# Patient Record
Sex: FEMALE
[De-identification: names, ages, dates, MRNs, and addresses within clinical notes are randomized per-mention and may not be internally consistent; named-entity substitution may affect disease eponyms.]

---

## 2017-07-17 ENCOUNTER — HOSPITAL ENCOUNTER (EMERGENCY)
Dept: HOSPITAL 5 - ED | Age: 52
Discharge: LEFT BEFORE DISCHARGE | End: 2017-07-17
Payer: MEDICARE

## 2017-07-17 VITALS — SYSTOLIC BLOOD PRESSURE: 123 MMHG | DIASTOLIC BLOOD PRESSURE: 80 MMHG

## 2017-07-17 DIAGNOSIS — R22.42: Primary | ICD-10-CM

## 2017-07-17 DIAGNOSIS — Z88.2: ICD-10-CM

## 2017-07-17 DIAGNOSIS — Z53.21: ICD-10-CM

## 2017-07-17 LAB
ALBUMIN SERPL-MCNC: 3.4 G/DL (ref 3.9–5)
ALBUMIN/GLOB SERPL: 0.9 %
ALP SERPL-CCNC: 117 UNITS/L (ref 35–129)
ALT SERPL-CCNC: 84 UNITS/L (ref 7–56)
ANION GAP SERPL CALC-SCNC: 20 MMOL/L
BASOPHILS NFR BLD AUTO: 0.7 % (ref 0–1.8)
BILIRUB SERPL-MCNC: 0.3 MG/DL (ref 0.1–1.2)
BUN SERPL-MCNC: 10 MG/DL (ref 7–17)
BUN/CREAT SERPL: 12.5 %
CALCIUM SERPL-MCNC: 8.8 MG/DL (ref 8.4–10.2)
CHLORIDE SERPL-SCNC: 99.3 MMOL/L (ref 98–107)
CO2 SERPL-SCNC: 24 MMOL/L (ref 22–30)
EOSINOPHIL NFR BLD AUTO: 1.9 % (ref 0–4.3)
GLUCOSE SERPL-MCNC: 100 MG/DL (ref 65–100)
HCT VFR BLD CALC: 39.9 % (ref 30.3–42.9)
HGB BLD-MCNC: 13.5 GM/DL (ref 10.1–14.3)
MCH RBC QN AUTO: 33 PG (ref 28–32)
MCHC RBC AUTO-ENTMCNC: 34 % (ref 30–34)
MCV RBC AUTO: 96 FL (ref 79–97)
PLATELET # BLD: 246 K/MM3 (ref 140–440)
POTASSIUM SERPL-SCNC: 4.8 MMOL/L (ref 3.6–5)
PROT SERPL-MCNC: 7.2 G/DL (ref 6.3–8.2)
RBC # BLD AUTO: 4.17 M/MM3 (ref 3.65–5.03)
SODIUM SERPL-SCNC: 138 MMOL/L (ref 137–145)
WBC # BLD AUTO: 8.6 K/MM3 (ref 4.5–11)

## 2017-07-17 PROCEDURE — 85025 COMPLETE CBC W/AUTO DIFF WBC: CPT

## 2017-07-17 PROCEDURE — 83880 ASSAY OF NATRIURETIC PEPTIDE: CPT

## 2017-07-17 PROCEDURE — 36415 COLL VENOUS BLD VENIPUNCTURE: CPT

## 2017-07-17 PROCEDURE — 80053 COMPREHEN METABOLIC PANEL: CPT

## 2017-07-17 NOTE — EMERGENCY DEPARTMENT REPORT
Chief Complaint: Extremity Problem,Nontraumatic


Stated Complaint: SWELLING IN LEFT LEG AND FOOT


Time Seen by Provider: 07/17/17 11:28





- HPI


History of Present Illness: 





PT c/o waking up on Saturday with leg swelling.  PT states her Left leg is more 

swollen than her R 





- ROS


Review of Systems: 





+ swelling


- chest pain 





- Exam


Vital Signs: 


 Vital Signs











  07/17/17





  11:20


 


Temperature 98.4 F


 


Pulse Rate 96 H


 


Respiratory 20





Rate 


 


Blood Pressure 120/69


 


O2 Sat by Pulse 100





Oximetry 











Physical Exam: 





scar to chest


+ swelling to LLE 


no calf tenderness jesús 


MSE screening note: 


Focused history and physical exam performed.


Due to findings the following was ordered:





labs, us 





ED Disposition for MSE


Condition: Stable

## 2017-07-18 NOTE — VASCULAR LAB REPORT
Left Lower Extremity Venous Duplex Study:



Reason for Exam: Swelling of the left lower extremity.



Comments on the Right:

A limited duplex study was done of the proximal veins of the right 

lower extremity. All veins visualized are freely compressible without 

evidence of internal echogenicity.  Flow is spontaneous and phasic 

throughout. No evidence of acute or chronic thrombus is seen in any of 

the vessels visualized.



Comments on the Left:

All veins visualized are freely compressible without evidence of 

internal echogenicity.  Flow is spontaneous and phasic throughout. No 

evidence of acute or chronic thrombus is seen in any of the vessels 

visualized.



Impression:

No evidence of acute or chronic deep venous thrombosis in the left 

lower extremity.

## 2017-07-18 NOTE — ED ELOPEMENT REVIEW
ED Pt Elopement review





- Results review


Lab results: 





 Laboratory Tests











  07/17/17 07/17/17





  11:36 11:36


 


WBC  8.6 


 


RBC  4.17 


 


Hgb  13.5 


 


Hct  39.9 


 


MCV  96 


 


MCH  33 H 


 


MCHC  34 


 


RDW  16.5 H 


 


Plt Count  246 


 


Lymph % (Auto)  39.8 H 


 


Mono % (Auto)  6.6 


 


Eos % (Auto)  1.9 


 


Baso % (Auto)  0.7 


 


Lymph #  3.4 


 


Mono #  0.6 


 


Eos #  0.2 


 


Baso #  0.1 


 


Seg Neutrophils %  51.0 


 


Seg Neutrophils #  4.4 


 


Sodium   138


 


Potassium   4.8


 


Chloride   99.3


 


Carbon Dioxide   24


 


Anion Gap   20


 


BUN   10


 


Creatinine   0.8


 


Estimated GFR   > 60


 


BUN/Creatinine Ratio   12.50


 


Glucose   100


 


Calcium   8.8


 


Total Bilirubin   0.30


 


AST   65 H


 


ALT   84 H


 


Alkaline Phosphatase   117


 


NT-Pro-B Natriuret Pep   272.1


 


Total Protein   7.2


 


Albumin   3.4 L


 


Albumin/Globulin Ratio   0.9














- Call Back decision


Pt Call Back Decision: Pt to F/U with PMD

## 2017-11-10 ENCOUNTER — HOSPITAL ENCOUNTER (EMERGENCY)
Dept: HOSPITAL 5 - ED | Age: 52
Discharge: HOME | End: 2017-11-10
Payer: MEDICARE

## 2017-11-10 DIAGNOSIS — L02.212: Primary | ICD-10-CM

## 2017-11-10 DIAGNOSIS — L03.312: ICD-10-CM

## 2017-11-10 DIAGNOSIS — F17.200: ICD-10-CM

## 2017-11-10 PROCEDURE — 71020: CPT

## 2017-11-10 NOTE — EMERGENCY DEPARTMENT REPORT
Abscess Boil HPI





- HPI


Chief Complaint: Skin/Abscess/Foreign Body


Stated Complaint: BOIL ON BACK


Time Seen by Provider: 11/10/17 19:12


Duration: >1 Week (3 months)


Location: Back


History: Yes Pain, Yes Previous History, No Fever, No Purulent Drainage, No 

Numbness, No Foreign Body, No Insect Bite


Home Medications: 


 Previous Rx's











 Medication  Instructions  Recorded  Last Taken  Type


 


Clindamycin [Clindamycin CAP] 300 mg PO Q8HR #30 capsule 11/10/17 Unknown Rx











Allergies/Adverse Reactions: 


 Allergies











Allergy/AdvReac Type Severity Reaction Status Date / Time


 


Sulfa (Sulfonamide AdvReac  Hives Verified 07/17/17 11:19





Antibiotics)     














ED Review of Systems


ROS: 


Stated complaint: BOIL ON BACK


Other details as noted in HPI





Constitutional: denies: chills, fever


Eyes: denies: eye pain, eye discharge, vision change


ENT: denies: ear pain, throat pain


Respiratory: denies: cough, shortness of breath, wheezing


Cardiovascular: denies: chest pain, palpitations


Endocrine: no symptoms reported


Gastrointestinal: denies: abdominal pain, nausea, diarrhea


Genitourinary: denies: urgency, dysuria, discharge


Musculoskeletal: denies: back pain, joint swelling, arthralgia


Skin: as per HPI


Neurological: denies: headache, weakness, paresthesias


Psychiatric: denies: anxiety, depression


Hematological/Lymphatic: denies: easy bleeding, easy bruising





ED Past Medical Hx





- Past Medical History


Previous Medical History?: Yes


Additional medical history: MVP.  Blood clot in left leg





- Surgical History


Past Surgical History?: No


Hx Open Heart Surgery: Yes





- Social History


Smoking Status: Current Every Day Smoker


Substance Use Type: Alcohol





- Medications


Home Medications: 


 Home Medications











 Medication  Instructions  Recorded  Confirmed  Last Taken  Type


 


Clindamycin [Clindamycin CAP] 300 mg PO Q8HR #30 capsule 11/10/17  Unknown Rx














ED Abscess Boil Physical Exam





- Exam


General: 


Vital signs noted. No distress. Alert and acting appropriately.





Size: 3 cm


Exam: Yes Tenderness, Yes Fluctuance, Yes Surrounding Cellulites/Erythema, Yes 

Normal Neurologic Exam, Yes Normal Circulation, No Lymphangitis, No Crepitation

, No Heart Murmur





I & D Note





- I & D Note


I & D Note: I/D abscess of the right upper back.  The site was prepped and 

draped in a sterile fashion.  It was anesthetized with 2% lidocaine.  The 

abscess was then drained with manual manipulation.  Patient tolerated the 

procedure with no complications.





ED Course


 Vital Signs











  11/10/17





  17:12


 


Temperature 97.7 F


 


Pulse Rate 100 H


 


Respiratory 16





Rate 


 


Blood Pressure 122/72


 


O2 Sat by Pulse 96





Oximetry 











Critical care attestation.: 


If time is entered above; I have spent that time in minutes in the direct care 

of this critically ill patient, excluding procedure time.








ED Disposition


Clinical Impression: 


 Abscess of upper back excluding scapular region, Cellulitis of back





Disposition: DC-01 TO HOME OR SELFCARE


Is pt being admited?: No


Does the pt Need Aspirin: No


Condition: Stable


Instructions:  Abscess Incision and Drainage (ED), Cellulitis (ED)


Additional Instructions: 


Take medicine as prescribed.  Remember to use an antibacterial soap when 

bathing at all times. 


Prescriptions: 


Clindamycin [Clindamycin CAP] 300 mg PO Q8HR #30 capsule


Referrals: 


PRIMARY CARE,MD [Primary Care Provider] - 3-5 Days


Time of Disposition: 20:01

## 2017-11-11 VITALS — SYSTOLIC BLOOD PRESSURE: 141 MMHG | DIASTOLIC BLOOD PRESSURE: 81 MMHG

## 2017-11-11 NOTE — XRAY REPORT
XRAY CHEST TWO VIEWS: 11/10/17 16:28:00



CLINICAL: Productive cough.



COMPARISON: None



FINDINGS: Normal heart and pulmonary vasculature. Lingular 

opacification on the lateral view and indistinctness of the left heart 

border on the frontal view.  Median sternotomy wires.



IMPRESSION: Suspect lingular pneumonia.

## 2017-12-26 ENCOUNTER — HOSPITAL ENCOUNTER (INPATIENT)
Dept: HOSPITAL 5 - ED | Age: 52
LOS: 2 days | Discharge: HOME | DRG: 638 | End: 2017-12-28
Attending: INTERNAL MEDICINE | Admitting: INTERNAL MEDICINE
Payer: MEDICARE

## 2017-12-26 DIAGNOSIS — E11.65: Primary | ICD-10-CM

## 2017-12-26 DIAGNOSIS — Z83.3: ICD-10-CM

## 2017-12-26 DIAGNOSIS — F17.200: ICD-10-CM

## 2017-12-26 DIAGNOSIS — F22: ICD-10-CM

## 2017-12-26 DIAGNOSIS — I10: ICD-10-CM

## 2017-12-26 DIAGNOSIS — N30.00: ICD-10-CM

## 2017-12-26 DIAGNOSIS — Z88.2: ICD-10-CM

## 2017-12-26 DIAGNOSIS — Z82.49: ICD-10-CM

## 2017-12-26 DIAGNOSIS — F10.10: ICD-10-CM

## 2017-12-26 LAB
ALBUMIN SERPL-MCNC: 3.2 G/DL (ref 3.9–5)
ALT SERPL-CCNC: 104 UNITS/L (ref 7–56)
BACTERIA #/AREA URNS HPF: (no result) /HPF
BASOPHILS # (AUTO): 0.1 K/MM3 (ref 0–0.1)
BASOPHILS NFR BLD AUTO: 0.9 % (ref 0–1.8)
BENZODIAZEPINES SCREEN,URINE: (no result)
BILIRUB UR QL STRIP: (no result)
BLOOD UR QL VISUAL: (no result)
BUN SERPL-MCNC: 9 MG/DL (ref 7–17)
BUN SERPL-MCNC: 9 MG/DL (ref 7–17)
BUN/CREAT SERPL: 10 %
BUN/CREAT SERPL: 9 %
CALCIUM SERPL-MCNC: 8.8 MG/DL (ref 8.4–10.2)
CALCIUM SERPL-MCNC: 9.3 MG/DL (ref 8.4–10.2)
EOSINOPHIL # BLD AUTO: 0.2 K/MM3 (ref 0–0.4)
EOSINOPHIL NFR BLD AUTO: 2.6 % (ref 0–4.3)
HCT VFR BLD CALC: 45.5 % (ref 30.3–42.9)
HEMOLYSIS INDEX: 6
HEMOLYSIS INDEX: 9
HGB BLD-MCNC: 15 GM/DL (ref 10.1–14.3)
LYMPHOCYTES # BLD AUTO: 3.4 K/MM3 (ref 1.2–5.4)
LYMPHOCYTES NFR BLD AUTO: 41.3 % (ref 13.4–35)
MCH RBC QN AUTO: 32 PG (ref 28–32)
MCHC RBC AUTO-ENTMCNC: 33 % (ref 30–34)
MCV RBC AUTO: 96 FL (ref 79–97)
METHADONE SCREEN,URINE: (no result)
MONOCYTES # (AUTO): 0.5 K/MM3 (ref 0–0.8)
MONOCYTES % (AUTO): 6.1 % (ref 0–7.3)
NITRITE UR QL STRIP: (no result)
OPIATE SCREEN,URINE: (no result)
PH UR STRIP: 6 [PH] (ref 5–7)
PLATELET # BLD: 179 K/MM3 (ref 140–440)
PROT UR STRIP-MCNC: (no result) MG/DL
RBC # BLD AUTO: 4.72 M/MM3 (ref 3.65–5.03)
RBC #/AREA URNS HPF: 3 /HPF (ref 0–6)
UROBILINOGEN UR-MCNC: < 2 MG/DL (ref ?–2)
WBC #/AREA URNS HPF: 19 /HPF (ref 0–6)

## 2017-12-26 PROCEDURE — 81001 URINALYSIS AUTO W/SCOPE: CPT

## 2017-12-26 PROCEDURE — 80307 DRUG TEST PRSMV CHEM ANLYZR: CPT

## 2017-12-26 PROCEDURE — G0480 DRUG TEST DEF 1-7 CLASSES: HCPCS

## 2017-12-26 PROCEDURE — 83036 HEMOGLOBIN GLYCOSYLATED A1C: CPT

## 2017-12-26 PROCEDURE — 82043 UR ALBUMIN QUANTITATIVE: CPT

## 2017-12-26 PROCEDURE — 85025 COMPLETE CBC W/AUTO DIFF WBC: CPT

## 2017-12-26 PROCEDURE — 36415 COLL VENOUS BLD VENIPUNCTURE: CPT

## 2017-12-26 PROCEDURE — 80053 COMPREHEN METABOLIC PANEL: CPT

## 2017-12-26 PROCEDURE — 85027 COMPLETE CBC AUTOMATED: CPT

## 2017-12-26 PROCEDURE — 82962 GLUCOSE BLOOD TEST: CPT

## 2017-12-26 PROCEDURE — 80320 DRUG SCREEN QUANTALCOHOLS: CPT

## 2017-12-26 PROCEDURE — 87086 URINE CULTURE/COLONY COUNT: CPT

## 2017-12-26 PROCEDURE — 80048 BASIC METABOLIC PNL TOTAL CA: CPT

## 2017-12-26 PROCEDURE — 80061 LIPID PANEL: CPT

## 2017-12-26 PROCEDURE — 96374 THER/PROPH/DIAG INJ IV PUSH: CPT

## 2017-12-26 PROCEDURE — 96375 TX/PRO/DX INJ NEW DRUG ADDON: CPT

## 2017-12-26 PROCEDURE — 96361 HYDRATE IV INFUSION ADD-ON: CPT

## 2017-12-26 NOTE — EMERGENCY DEPARTMENT REPORT
ED Female  HPI





- General


Chief complaint: Psych


Stated complaint: MENTAL HEALTH MEDICATION CHECK


Time Seen by Provider: 12/26/17 19:25


Source: patient


Mode of arrival: Ambulatory


Limitations: No Limitations





- History of Present Illness


Initial comments: 





51 YO FEMALE C/O URINARY INCONTINENCE FOR 3 MONTHS SINCE SHE BEGAN TAKING 

DOXEPIN. SHE WANTS TO CHANGE MEDICATIONS  AND HER APPOINTMENT IS  TOMORROW WITH 

DR GRANGER AT 1200.  PT DENIES BEING DIABETIC.   SHE IS HAVING THOUGHTS THAT 

THE KKK ARE AFTER HER  SINCE HER DAD WAS HEAD [employer] DOWN IN [facility] AND 

SHE  AN [place]. THEY WERE NOT PLESED WITH EHR MARRIAGE.


MD Complaint: dysuria, other (URINARY INCONTINENCE)





- Related Data


 Previous Rx's











 Medication  Instructions  Recorded  Last Taken  Type


 


Clindamycin [Clindamycin CAP] 300 mg PO Q8HR #30 capsule 11/10/17 Unknown Rx











 Allergies











Allergy/AdvReac Type Severity Reaction Status Date / Time


 


Sulfa (Sulfonamide AdvReac  Hives Verified 12/26/17 11:59





Antibiotics)     














ED Review of Systems


ROS: 


Stated complaint: MENTAL HEALTH MEDICATION CHECK


Other details as noted in HPI





Constitutional: denies: chills, fever


Eyes: denies: eye pain, eye discharge, vision change


ENT: denies: ear pain, throat pain


Respiratory: denies: cough, shortness of breath, wheezing


Cardiovascular: denies: chest pain, palpitations


Endocrine: no symptoms reported


Gastrointestinal: denies: abdominal pain, nausea, diarrhea


Genitourinary: denies: urgency, dysuria, discharge


Musculoskeletal: denies: back pain, joint swelling, arthralgia


Skin: denies: rash, lesions


Neurological: denies: headache, weakness, paresthesias


Psychiatric: other (DELUSIONS).  denies: anxiety, depression


Hematological/Lymphatic: denies: easy bleeding, easy bruising





ED Past Medical Hx





- Past Medical History


Previous Medical History?: Yes


Hx Psychiatric Treatment: Yes


Additional medical history: MVP,MRSA SKIN INFECTION,HEPB,HEP C.  Blood clot in 

left leg





- Surgical History


Hx Open Heart Surgery: Yes





- Social History


Smoking Status: Current Every Day Smoker


Substance Use Type: None





- Medications


Home Medications: 


 Home Medications











 Medication  Instructions  Recorded  Confirmed  Last Taken  Type


 


Clindamycin [Clindamycin CAP] 300 mg PO Q8HR #30 capsule 11/10/17  Unknown Rx














ED Physical Exam





- General


Limitations: No Limitations


General appearance: alert, in no apparent distress





- Head


Head exam: Present: atraumatic, normocephalic





- Eye


Eye exam: Present: normal appearance, EOMI





- ENT


ENT exam: Present: mucous membranes moist





- Neck


Neck exam: Present: normal inspection, full ROM





- Respiratory


Respiratory exam: Present: normal lung sounds bilaterally.  Absent: respiratory 

distress





- Cardiovascular


Cardiovascular Exam: Present: regular rate, normal rhythm.  Absent: systolic 

murmur, diastolic murmur, rubs, gallop





- GI/Abdominal


GI/Abdominal exam: Present: soft, normal bowel sounds





- Rectal


Rectal exam: Present: deferred





- Extremities Exam


Extremities exam: Present: normal inspection





- Back Exam


Back exam: Present: normal inspection





- Neurological Exam


Neurological exam: Present: alert, oriented X3, CN II-XII intact





- Psychiatric


Psychiatric exam: Present: normal affect, normal mood, other (DELUSIONAL)





- Skin


Skin exam: Present: warm, dry, intact, normal color.  Absent: rash





ED Course


 Vital Signs











  12/26/17 12/26/17 12/26/17





  11:59 18:50 19:00


 


Temperature 98.1 F  97.6 F


 


Pulse Rate 93 H 78 75


 


Respiratory 20 16 16





Rate   


 


Blood Pressure 104/64  


 


Blood Pressure  109/65 107/66





[Left]   


 


O2 Sat by Pulse 95 96 97





Oximetry   














ED Medical Decision Making





- Lab Data


Result diagrams: 


 12/26/17 12:29





 12/26/17 15:00


Critical care attestation.: 


If time is entered above; I have spent that time in minutes in the direct care 

of this critically ill patient, excluding procedure time.








ED Disposition


Clinical Impression: 


 New onset type 2 diabetes mellitus





UTI (urinary tract infection)


Qualifiers:


 Urinary tract infection type: acute cystitis Hematuria presence: without 

hematuria Qualified Code(s): N30.00 - Acute cystitis without hematuria





Disposition: DC-09 OP ADMIT IP TO THIS HOSP


Is pt being admited?: Yes


Condition: Stable


Instructions:  Diabetes Mellitus Type 2 in Adults (ED)


Referrals: 


PRIMARY CARE,MD [Primary Care Provider] - 3-5 Days


Time of Disposition: 22:08 (CASE REVIEWED WITH DR TORRES AND SHE WILL ADMIT 

HER TO THE HOSPITAL)

## 2017-12-27 LAB
CREATININE,URINE: 60.1 MG/DL (ref 0.1–20)
HDLC SERPL-MCNC: 23 MG/DL (ref 40–59)

## 2017-12-27 RX ADMIN — ENOXAPARIN SODIUM SCH MG: 100 INJECTION SUBCUTANEOUS at 11:14

## 2017-12-27 RX ADMIN — INSULIN ASPART SCH: 100 INJECTION, SOLUTION INTRAVENOUS; SUBCUTANEOUS at 11:30

## 2017-12-27 RX ADMIN — METFORMIN HYDROCHLORIDE SCH MG: 500 TABLET ORAL at 11:14

## 2017-12-27 RX ADMIN — ARIPIPRAZOLE SCH MG: 15 TABLET ORAL at 11:14

## 2017-12-27 RX ADMIN — HYDROCHLOROTHIAZIDE SCH MG: 25 TABLET ORAL at 11:14

## 2017-12-27 RX ADMIN — GABAPENTIN SCH MG: 300 CAPSULE ORAL at 11:15

## 2017-12-27 RX ADMIN — CLOPIDOGREL BISULFATE SCH MG: 75 TABLET ORAL at 11:16

## 2017-12-27 RX ADMIN — METOPROLOL SUCCINATE SCH MG: 25 TABLET, FILM COATED, EXTENDED RELEASE ORAL at 11:17

## 2017-12-27 RX ADMIN — CEFTRIAXONE SODIUM SCH MLS/10 MIN: 10 INJECTION, POWDER, FOR SOLUTION INTRAVENOUS at 11:44

## 2017-12-27 RX ADMIN — INSULIN ASPART SCH: 100 INJECTION, SOLUTION INTRAVENOUS; SUBCUTANEOUS at 07:30

## 2017-12-27 RX ADMIN — INSULIN ASPART SCH UNITS: 100 INJECTION, SOLUTION INTRAVENOUS; SUBCUTANEOUS at 18:48

## 2017-12-27 RX ADMIN — MELOXICAM SCH MG: 7.5 TABLET ORAL at 11:15

## 2017-12-27 RX ADMIN — METFORMIN HYDROCHLORIDE SCH MG: 500 TABLET ORAL at 17:50

## 2017-12-27 RX ADMIN — INSULIN ASPART SCH UNITS: 100 INJECTION, SOLUTION INTRAVENOUS; SUBCUTANEOUS at 22:35

## 2017-12-27 NOTE — EVENT NOTE
Date: 12/27/17


Patient seen and examined, resting comfortably in no acute distress. reports 

pain with cough, no erythema noted. patient on abx already, will adjust insulin 

for better control. DVT/GI prophy. Continue current therapy

## 2017-12-27 NOTE — HISTORY AND PHYSICAL REPORT
History of Present Illness


Chief complaint: 





polyuria


History of present illness: 





52-year-old woman with past medical history of hypertension who presents 

complaining of polyuria and polydipsia for about 3 months.  She is experiencing 

incontinence.  She is also now having increased frequency, she is not able to 

make it to the bathroom is times.  She admits to fatigue also.  She denies 

fevers or chills, denies focal weakness.  Denies chest pain or shortness of 

breath





Also she is complaining that she thinks that the KKK is  out to get her, that 

she recently  and Chinese man and she thinks that they're very upset 

about it





Past History


Past Medical History: hypertension, other (mitral valve prolapse, delusional 

disorder)


Past Surgical History: Other (open heart surgery)


Social history: smoking, alcohol abuse (denies current abuse of alcohol this 

time but in the past admitted)


Family history: diabetes, hypertension





Medications and Allergies


 Allergies











Allergy/AdvReac Type Severity Reaction Status Date / Time


 


Sulfa (Sulfonamide AdvReac  Hives Verified 12/26/17 11:59





Antibiotics)     











 Home Medications











 Medication  Instructions  Recorded  Confirmed  Last Taken  Type


 


ARIPiprazole [Abilify] 15 mg PO DAILY 12/26/17 12/26/17 Unknown History


 


AtorvaSTATin [Lipitor] 40 mg PO QHS 12/26/17 12/26/17 Unknown History


 


Clopidogrel [Plavix] 75 mg PO QDAY 12/26/17 12/26/17 Unknown History


 


Doxepin HCl 150 mg PO DAILY 12/26/17 12/26/17 Unknown History


 


Gabapentin [Neurontin] 600 mg PO DAILY 12/26/17 12/26/17 Unknown History


 


Hydrochlorothiazide [HCTZ] 25 mg PO QDAY 12/26/17 12/26/17 Unknown History


 


Meloxicam 15 mg PO DAILY 12/26/17 12/26/17 Unknown History


 


Metoprolol Xl [Metoprolol 25 mg PO QDAY 12/26/17 12/26/17 Unknown History





SUCCINATE ER TAB]     


 


clonazePAM [Klonopin] 0.5 mg PO DAILY 12/26/17 12/26/17 Unknown History











Active Meds: 


Active Medications





Acetaminophen (Tylenol)  650 mg PO Q4H PRN


   PRN Reason: Pain MILD(1-3)/Fever >100.5/HA


Aripiprazole (Abilify)  15 mg PO DAILY KEVIN


Atorvastatin Calcium (Lipitor)  40 mg PO QHS [facility]


Bisacodyl (Dulcolax)  10 mg RI QDAY PRN


   PRN Reason: Constipation unrelieved by MOM


Clonazepam (Klonopin)  0.5 mg PO DAILY [facility]


Clopidogrel Bisulfate (Plavix)  75 mg PO QDAY [facility]


Dextrose (D50w (25gm) Syringe)  50 ml IV PRN PRN


   PRN Reason: Hypoglycemia


Enoxaparin Sodium (Lovenox)  40 mg SUB-Q QDAY KEVIN


Gabapentin (Neurontin)  600 mg PO DAILY [facility]


Hydrochlorothiazide (Hctz)  25 mg PO QDAY [facility]


Ceftriaxone Sodium (Rocephin/Ns 1 Gm/50 Ml)  1 gm in 50 mls @ 100 mls/hr IV 

Q24HR KEVIN


   PRN Reason: Protocol


Insulin Aspart (Novolog)  0 units SUB-Q ACHS KEVIN


   PRN Reason: Protocol


Insulin Detemir (Levemir)  10 units SUB-Q QHS [facility]


Magnesium Hydroxide (Milk Of Magnesia)  30 ml PO Q4H PRN


   PRN Reason: Constipation


Meloxicam (Mobic)  15 mg PO QDAY [facility]


Metoprolol Succinate (Toprol Xl)  25 mg PO QDAY [facility]


Miscellaneous Medication (Doxepin Hcl [Doxepin Hcl])  150 mg PO DAILY [facility]


Ondansetron HCl (Zofran)  4 mg IV Q8H PRN


   PRN Reason: N/V unrelieved by Reglan











Review of Systems


All systems: negative (14 point review of systems is otherwise negative except 

as stated in HPI)





Exam





- Constitutional


Vitals: 


 











Temp Pulse Resp BP Pulse Ox


 


 97.6 F   81   17   112/57   92 


 


 12/26/17 19:00  12/26/17 23:00  12/26/17 23:00  12/26/17 23:00  12/26/17 23:00











General appearance: Present: no acute distress, well-nourished





- EENT


Eyes: Present: PERRL


ENT: hearing intact, clear oral mucosa





- Neck


Neck: Present: supple, normal ROM





- Respiratory


Respiratory effort: normal


Respiratory: bilateral: CTA





- Cardiovascular


Heart Sounds: Present: S1 & S2.  Absent: rub, click





- Extremities


Extremities: pulses symmetrical, No edema


Peripheral Pulses: within normal limits





- Abdominal


General gastrointestinal: Present: soft, non-tender, non-distended, normal 

bowel sounds


Female genitourinary: Present: normal





- Integumentary


Integumentary: Present: clear, warm, dry





- Musculoskeletal


Musculoskeletal: gait normal, strength equal bilaterally





- Psychiatric


Psychiatric: appropriate mood/affect, intact judgment & insight





- Neurologic


Neurologic: CNII-XII intact, moves all extremities





Results





- Labs


CBC & Chem 7: 


 12/26/17 12:29





 12/26/17 21:04


Labs: 


 Laboratory Last Values











WBC  8.2 K/mm3 (4.5-11.0)   12/26/17  12:29    


 


RBC  4.72 M/mm3 (3.65-5.03)   12/26/17  12:29    


 


Hgb  15.0 gm/dl (10.1-14.3)  H  12/26/17  12:29    


 


Hct  45.5 % (30.3-42.9)  H  12/26/17  12:29    


 


MCV  96 fl (79-97)   12/26/17  12:29    


 


MCH  32 pg (28-32)   12/26/17  12:29    


 


MCHC  33 % (30-34)   12/26/17  12:29    


 


RDW  13.6 % (13.2-15.2)   12/26/17  12:29    


 


Plt Count  179 K/mm3 (140-440)   12/26/17  12:29    


 


Lymph % (Auto)  41.3 % (13.4-35.0)  H  12/26/17  12:29    


 


Mono % (Auto)  6.1 % (0.0-7.3)   12/26/17  12:29    


 


Eos % (Auto)  2.6 % (0.0-4.3)   12/26/17  12:29    


 


Baso % (Auto)  0.9 % (0.0-1.8)   12/26/17  12:29    


 


Lymph #  3.4 K/mm3 (1.2-5.4)   12/26/17  12:29    


 


Mono #  0.5 K/mm3 (0.0-0.8)   12/26/17  12:29    


 


Eos #  0.2 K/mm3 (0.0-0.4)   12/26/17  12:29    


 


Baso #  0.1 K/mm3 (0.0-0.1)   12/26/17  12:29    


 


Seg Neutrophils %  49.1 % (40.0-70.0)   12/26/17  12:29    


 


Seg Neutrophils #  4.0 K/mm3 (1.8-7.7)   12/26/17  12:29    


 


Sodium  139 mmol/L (137-145)  D 12/26/17  21:04    


 


Potassium  3.7 mmol/L (3.6-5.0)   12/26/17  21:04    


 


Chloride  96.0 mmol/L ()  L  12/26/17  21:04    


 


Carbon Dioxide  26 mmol/L (22-30)   12/26/17  21:04    


 


Anion Gap  21 mmol/L  12/26/17  21:04    


 


BUN  9 mg/dL (7-17)   12/26/17  21:04    


 


Creatinine  0.9 mg/dL (0.7-1.2)   12/26/17  21:04    


 


Estimated GFR  > 60 ml/min  12/26/17  21:04    


 


BUN/Creatinine Ratio  10 %  12/26/17  21:04    


 


Glucose  427 mg/dL ()  H  12/26/17  21:04    


 


POC Glucose  299  ()  H  12/27/17  00:13    


 


Calcium  8.8 mg/dL (8.4-10.2)   12/26/17  21:04    


 


Total Bilirubin  0.50 mg/dL (0.1-1.2)   12/26/17  21:04    


 


AST  88 units/L (5-40)  H  12/26/17  21:04    


 


ALT  104 units/L (7-56)  H  12/26/17  21:04    


 


Alkaline Phosphatase  219 units/L ()  H  12/26/17  21:04    


 


Total Protein  6.6 g/dL (6.3-8.2)   12/26/17  21:04    


 


Albumin  3.2 g/dL (3.9-5)  L  12/26/17  21:04    


 


Albumin/Globulin Ratio  0.9 %  12/26/17  21:04    


 


Urine Color  Yellow  (Yellow)   12/26/17  12:41    


 


Urine Turbidity  Clear  (Clear)   12/26/17  12:41    


 


Urine pH  6.0  (5.0-7.0)   12/26/17  12:41    


 


Ur Specific Gravity  1.036  (1.003-1.030)  H  12/26/17  12:41    


 


Urine Protein  <15 mg/dl mg/dL (Negative)   12/26/17  12:41    


 


Urine Glucose (UA)  >=500 mg/dL (Negative)   12/26/17  12:41    


 


Urine Ketones  Neg mg/dL (Negative)   12/26/17  12:41    


 


Urine Blood  Neg  (Negative)   12/26/17  12:41    


 


Urine Nitrite  Neg  (Negative)   12/26/17  12:41    


 


Urine Bilirubin  Neg  (Negative)   12/26/17  12:41    


 


Urine Urobilinogen  < 2.0 mg/dL (<2.0)   12/26/17  12:41    


 


Ur Leukocyte Esterase  Mod  (Negative)   12/26/17  12:41    


 


Urine WBC (Auto)  19.0 /HPF (0.0-6.0)  H  12/26/17  12:41    


 


Urine RBC (Auto)  3.0 /HPF (0.0-6.0)   12/26/17  12:41    


 


U Epithel Cells (Auto)  3.0 /HPF (0-13.0)   12/26/17  12:41    


 


Urine Bacteria (Auto)  2+ /HPF (Negative)   12/26/17  12:41    


 


Urine Opiates Screen  Presumptive negative   12/26/17  Unknown


 


Urine Methadone Screen  Presumptive negative   12/26/17  Unknown


 


Ur Barbiturates Screen  Presumptive negative   12/26/17  Unknown


 


Ur Phencyclidine Scrn  Presumptive negative   12/26/17  Unknown


 


Ur Amphetamines Screen  Presumptive negative   12/26/17  Unknown


 


U Benzodiazepines Scrn  Presumptive negative   12/26/17  Unknown


 


Urine Cocaine Screen  Presumptive negative   12/26/17  Unknown


 


U Marijuana (THC) Screen  Presumptive negative   12/26/17  Unknown


 


Drugs of Abuse Note  Disclamer   12/26/17  Unknown


 


Plasma/Serum Alcohol  < 0.01 gm% (0-0.07)   12/26/17  12:29    














Assessment and Plan


Assessment and plan: 





Physically MARJAN Parikh new-onset diabetes with hyperglycemia





Type 2 diabetes, new onset hyperglycemia


Has received insulin, improving


We'll start her on sliding scale along with glipizide and metformin, obtain 

hemoglobin A1c, microalbumin and lipid panel





UTI


Started on Rocephin, obtain urine culture is





Delusional disorder?


Mental health consult





Hypertension


Resume home meds





DVT prophylaxis


Lovenox

## 2017-12-28 VITALS — SYSTOLIC BLOOD PRESSURE: 107 MMHG | DIASTOLIC BLOOD PRESSURE: 59 MMHG

## 2017-12-28 LAB
BUN SERPL-MCNC: 13 MG/DL (ref 7–17)
BUN/CREAT SERPL: 19 %
CALCIUM SERPL-MCNC: 8 MG/DL (ref 8.4–10.2)
HCT VFR BLD CALC: 39.5 % (ref 30.3–42.9)
HEMOLYSIS INDEX: 4
HGB BLD-MCNC: 13.1 GM/DL (ref 10.1–14.3)
MCH RBC QN AUTO: 31 PG (ref 28–32)
MCHC RBC AUTO-ENTMCNC: 33 % (ref 30–34)
MCV RBC AUTO: 93 FL (ref 79–97)
PLATELET # BLD: 155 K/MM3 (ref 140–440)
RBC # BLD AUTO: 4.25 M/MM3 (ref 3.65–5.03)

## 2017-12-28 RX ADMIN — METOPROLOL SUCCINATE SCH: 25 TABLET, FILM COATED, EXTENDED RELEASE ORAL at 10:00

## 2017-12-28 RX ADMIN — ARIPIPRAZOLE SCH MG: 15 TABLET ORAL at 12:59

## 2017-12-28 RX ADMIN — INSULIN ASPART SCH UNITS: 100 INJECTION, SOLUTION INTRAVENOUS; SUBCUTANEOUS at 08:34

## 2017-12-28 RX ADMIN — ENOXAPARIN SODIUM SCH MG: 100 INJECTION SUBCUTANEOUS at 12:53

## 2017-12-28 RX ADMIN — METFORMIN HYDROCHLORIDE SCH MG: 500 TABLET ORAL at 08:27

## 2017-12-28 RX ADMIN — CLOPIDOGREL BISULFATE SCH MG: 75 TABLET ORAL at 12:56

## 2017-12-28 RX ADMIN — GABAPENTIN SCH MG: 300 CAPSULE ORAL at 13:01

## 2017-12-28 RX ADMIN — HYDROCHLOROTHIAZIDE SCH: 25 TABLET ORAL at 13:06

## 2017-12-28 RX ADMIN — METFORMIN HYDROCHLORIDE SCH MG: 500 TABLET ORAL at 16:54

## 2017-12-28 RX ADMIN — INSULIN ASPART SCH UNITS: 100 INJECTION, SOLUTION INTRAVENOUS; SUBCUTANEOUS at 12:55

## 2017-12-28 RX ADMIN — CEFTRIAXONE SODIUM SCH MLS/10 MIN: 10 INJECTION, POWDER, FOR SOLUTION INTRAVENOUS at 13:16

## 2017-12-28 RX ADMIN — MELOXICAM SCH MG: 7.5 TABLET ORAL at 12:56

## 2017-12-28 RX ADMIN — INSULIN ASPART SCH UNITS: 100 INJECTION, SOLUTION INTRAVENOUS; SUBCUTANEOUS at 16:55

## 2017-12-28 NOTE — DISCHARGE SUMMARY
Providers





- Providers


Date of Admission: 


12/27/17 00:46





Attending physician: 


NAYELI HIGHTOWER MD





 





12/27/17 00:46


Consult to Dietitian/Nutrition [CONS] Routine 


   Physician Instructions: 


   Reason For Exam: 


   Reason for Consult: Diet education





12/27/17 00:49


Consult to Mental Health [CONS] Routine 


   Reason For Exam: behavioral disturbance


   Place consult to:: [facility]


   Notified:: 


   Phone number called:: [phone]


   Was contact made?: Yes


   If yes, spoke with:: evans


   Time called:: 08:03











Primary care physician: 


PRIMARY CARE MD








Hospitalization


Reason for admission: new onset diabetes mellitus


Condition: Stable


Hospital course: 


52-year-old woman with past medical history of hypertension who presents 

complaining of polyuria and polydipsia for about 3 months.  She is experiencing 

incontinence.  She is also now having increased frequency, she is not able to 

make it to the bathroom is times.  She admits to fatigue also.  She denies 

fevers or chills, denies focal weakness.  Denies chest pain or shortness of 

breath


Also she is complaining that she thinks that the [employer] is  out to get her, that 

she recently  and [place] man and she thinks that they're very upset 

about it.  Psychiatry was consulted to evaluate the patient to [facility] 

Center  discussed with the patient to follow the patient on this point 

does not demonstrate any delirium.  I did discuss with the patient about 

statements she states she was just Hernandez.  She denies any suicidal or 

homicidal ideation.


She was started on insulin therapy with noted elevated hemoglobin A1c.  I did 

advise her about diabetes management also need to be on an Ace inhibitor. She 

was started on abx, cultures were unrevealing. counselling was provided about 

tobacco use.





Type 2 diabetes, new onset hyperglycemia


Acute Cystitis


Delusional disorder?


Hypertension


Tobacco abuse





Disposition: DC-01 TO HOME OR SELFCARE


Time spent for discharge: 35 mins





Core Measure Documentation





- Palliative Care


Palliative Care/ Comfort Measures: Not Applicable





- Core Measures


Any of the following diagnoses?: none





- VTE Discharge Requirements


Deep Vein Thrombosis/Pulmonary Embolism Present on Admission: No





Exam





- Physical Exam


Narrative exam: 


 VITAL SIGNS:  Reviewed.    


GENERAL:  The patient appeared well nourished and normally developed. Vital 

signs as documented.


HEAD:  No signs of head trauma.


EYES:  Pupils are equal.  Extraocular motions intact.  


EARS:  Hearing grossly intact.


MOUTH:  Oropharynx is normal. 


NECK:  No adenopathy, no JVD.   


CHEST:  Chest with clear breath sounds bilaterally.  No wheezes, rales, or 

rhonchi.  


CARDIAC:  Regular rate and rhythm.  S1 and S2, without murmurs, gallops, or 

rubs.


VASCULAR:  No Edema.  Peripheral pulses normal and equal in all extremities.


ABDOMEN:  Soft, without detectable tenderness.  No sign of distention.  No   

rebound or guarding, and no masses palpated.   Bowel Sounds normal.


MUSCULOSKELETAL:  Good range of motion of all major joints. Extremities without 

clubbing, cyanosis or edema.  


NEUROLOGIC EXAM:  Alert and oriented x 3.  No focal sensory or strength 

deficits.   Speech normal.  Follows commands.


PSYCHIATRIC:  Mood normal.


SKIN:  No rash or lesions.














- Constitutional


Vitals: 


 











Temp Pulse Resp BP Pulse Ox


 


 97.7 F   83   22   106/56   93 


 


 12/28/17 08:10  12/28/17 08:10  12/28/17 08:10  12/28/17 08:10  12/28/17 08:10














Plan


Activity: advance as tolerated, fall precautions


Diet: diabetic


Special Instructions: record daily weights, record daily BP diary, record blood 

sugar diary, smoking cessation


Durable Medical Equipment Needed Upon Discharge: other


Follow up with: 


INDIRA VILLA MD [Staff Physician] - 7 Days


Prescriptions: 


Ciprofloxacin HCl [Ciprofloxacin TAB] 750 mg PO DAILY #3 tablet


Insulin NPH/Regular [NovoLIN 70/30] 20 unit SQ BIDDIAB 30 Days  ml


metFORMIN [Glucophage] 1,000 mg PO BIDDIAB #30 tablet


Other Discharge Orders: 


Glucometer  (Amb) Location: Determined By Patient


Glucometer supplies[Amb] Location: Determined By Patient

## 2018-08-01 ENCOUNTER — HOSPITAL ENCOUNTER (OUTPATIENT)
Dept: HOSPITAL 5 - XRAY | Age: 53
Discharge: HOME | End: 2018-08-01
Attending: PAIN MEDICINE
Payer: MEDICARE

## 2018-08-01 DIAGNOSIS — J44.9: ICD-10-CM

## 2018-08-01 DIAGNOSIS — Z88.1: ICD-10-CM

## 2018-08-01 DIAGNOSIS — F17.210: ICD-10-CM

## 2018-08-01 DIAGNOSIS — M47.896: Primary | ICD-10-CM

## 2018-08-01 DIAGNOSIS — I10: ICD-10-CM

## 2018-08-01 DIAGNOSIS — Z91.81: ICD-10-CM

## 2018-08-01 DIAGNOSIS — M17.0: ICD-10-CM

## 2018-08-01 PROCEDURE — 72110 X-RAY EXAM L-2 SPINE 4/>VWS: CPT

## 2018-08-01 NOTE — XRAY REPORT
BILATERAL KNEES, 3 VIEWS



History: Bilateral primary osteoarthritis.



Findings: Bone mineralization is normal. The joint spaces are within 

normal limits. No significant degenerative changes are identified. No 

evidence for fracture, bone lesion or joint effusion.



Impression: Unremarkable bilateral knees.

## 2018-08-01 NOTE — XRAY REPORT
LUMBOSACRAL SPINE, FIVE VIEWS:



HISTORY: Spondylosis without myelopathy.



No evidence for compression deformity or bone lesion. 5 mm 

anterolisthesis of L4 with respect to L5 is identified which appears to 

be secondary to degenerative facet arthropathy. Mild disc space 

narrowing is present at L3-4 and L4-5. Advanced disc space narrowing at 

L5-S1. There is moderate diffuse facet arthropathy with mild 

hypertrophic changes. The oblique images are limited but no gross 

high-grade neural foraminal narrowing is detected.



IMPRESSION:

Moderate multilevel lumbar spondylosis as described.

## 2018-08-01 NOTE — XRAY REPORT
RIGHT HAND, 2 views:



History: Fall.



The bony architecture is intact.  Bony alignment is normal. No

soft tissue abnormalities are seen.  The joint spaces appear

preserved.



IMPRESSION:

Unremarkable right hand.

## 2018-08-01 NOTE — XRAY REPORT
RIGHT FOOT, 2 views:



History: Fall



The bony architecture is intact.  Bony alignment is normal.

No soft tissue abnormalities are seen.  The joint spaces appear

preserved. Small plantar spur is noted.



IMPRESSION:

Small plantar spur.

## 2018-10-19 ENCOUNTER — HOSPITAL ENCOUNTER (OUTPATIENT)
Dept: HOSPITAL 5 - XRAY | Age: 53
Discharge: HOME | End: 2018-10-19
Attending: PAIN MEDICINE
Payer: MEDICARE

## 2018-10-19 DIAGNOSIS — M48.07: ICD-10-CM

## 2018-10-19 DIAGNOSIS — I10: ICD-10-CM

## 2018-10-19 DIAGNOSIS — M47.896: Primary | ICD-10-CM

## 2018-10-19 DIAGNOSIS — E11.9: ICD-10-CM

## 2018-10-19 DIAGNOSIS — Z87.891: ICD-10-CM

## 2018-10-19 DIAGNOSIS — M46.86: ICD-10-CM

## 2018-10-19 DIAGNOSIS — J44.9: ICD-10-CM

## 2018-10-19 PROCEDURE — 72110 X-RAY EXAM L-2 SPINE 4/>VWS: CPT

## 2018-10-19 NOTE — XRAY REPORT
FINAL REPORT



EXAM:  XR SPINE LUMBOSACRAL 4+V



HISTORY:  Spondylosis w/o myelopathy or radiculopathy, lumbar

region 



TECHNIQUE:  Lumbar spine 6 views 



PRIORS:  None.



FINDINGS:  

Demonstrated is disc space narrowing at L4-5 and L5-S1. There is

marked facet joint hypertrophy throughout the lumbar spine most

prominent from L3-L4 through L5-S1. There is grade 1

anterolisthesis of L4 relative to L5. Vertebral bodies are normal

height. Posterior elements are intact. 



IMPRESSION:  

Marked facet joint arthropathy most prominent lower lumbar spine 



Grade 1 spondylolisthesis at L4-5



Degenerative disc space narrowing L4-5 L5-S1